# Patient Record
Sex: FEMALE | Race: WHITE | NOT HISPANIC OR LATINO | Employment: UNEMPLOYED | URBAN - METROPOLITAN AREA
[De-identification: names, ages, dates, MRNs, and addresses within clinical notes are randomized per-mention and may not be internally consistent; named-entity substitution may affect disease eponyms.]

---

## 2017-02-01 ENCOUNTER — OFFICE VISIT (OUTPATIENT)
Dept: OBSTETRICS AND GYNECOLOGY | Facility: CLINIC | Age: 27
End: 2017-02-01

## 2017-02-01 VITALS
WEIGHT: 221.4 LBS | DIASTOLIC BLOOD PRESSURE: 70 MMHG | BODY MASS INDEX: 35.58 KG/M2 | SYSTOLIC BLOOD PRESSURE: 122 MMHG | HEIGHT: 66 IN

## 2017-02-01 DIAGNOSIS — Z01.419 WELL WOMAN EXAM WITH ROUTINE GYNECOLOGICAL EXAM: Primary | ICD-10-CM

## 2017-02-01 DIAGNOSIS — Z30.46 NEXPLANON REMOVAL: ICD-10-CM

## 2017-02-01 PROCEDURE — 99395 PREV VISIT EST AGE 18-39: CPT | Performed by: NURSE PRACTITIONER

## 2017-02-01 PROCEDURE — 11982 REMOVE DRUG IMPLANT DEVICE: CPT | Performed by: NURSE PRACTITIONER

## 2017-02-01 RX ORDER — LEVONORGESTREL AND ETHINYL ESTRADIOL 0.1-0.02MG
1 KIT ORAL DAILY
Qty: 28 TABLET | Refills: 12 | Status: SHIPPED | OUTPATIENT
Start: 2017-02-01 | End: 2018-02-01

## 2017-02-01 NOTE — PROGRESS NOTES
Subjective     Chief Complaint   Patient presents with   • Gynecologic Exam     AE,PERIODS GETTING WORSE ON NEXPLANON, HAVING A LOT OF EMOTIONAL PROBLEMS,        History of Present Illness    Patience Leeanna is a 26 y.o. female who presents for annual exam. She is 1 year postpartum with her second child. She had nexplanon inserted about 2 months following delivery. She c/o emotional lability, depressed mood, and irritability since her daughter was born last year. Feels as if nexplanon has made these issues worse. Denies hx of depression or postpartum depression. Denies issues with bonding and feelings of hurting herself and baby. She reports periods mostly regular on nexplanon, about monthly lasting a week. She denies hx of abnormal paps in the past.     Obstetric History:  OB History     No data available         Menstrual History:     Patient's last menstrual period was 01/23/2017.       Sexual History:     Her menses are irregular, lasting 4-7 days.    Current contraception: nexplanon  History of abnormal Pap smear: no  Received Gardasil immunization: yes - .  Perform regular self breast exam: yes - .  Family history of uterine or ovarian cancer: yes - mother just recently dx  Family History of colon cancer: no  Family history of breast cancer: no    Mammogram: not indicated.  Colonoscopy: not indicated.  DEXA: not indicated.  Last Pap:    Smoking status: Current Every Day Smoker                                                   Packs/day: 0.00      Years: 0.00         Types: Electronic Cigarette     Smokeless status: Not on file                       Exercise: moderately active  Calcium/Vitamin D: adequate intake    The following portions of the patient's history were reviewed and updated as appropriate: allergies, current medications, past family history, past medical history, past social history, past surgical history and problem list.    Review of Systems   Constitutional: Negative.    HENT: Negative.   "  Eyes: Negative for visual disturbance.   Respiratory: Negative for cough, shortness of breath and wheezing.    Cardiovascular: Negative for chest pain, palpitations and leg swelling.   Gastrointestinal: Negative for abdominal distention, abdominal pain, blood in stool, constipation, diarrhea, nausea and vomiting.   Endocrine: Negative for cold intolerance and heat intolerance.   Genitourinary: Negative for difficulty urinating, dyspareunia, dysuria, frequency, genital sores, hematuria, menstrual problem, pelvic pain, urgency, vaginal bleeding, vaginal discharge and vaginal pain.   Musculoskeletal: Negative.    Skin: Negative.    Neurological: Negative for dizziness, weakness, light-headedness, numbness and headaches.   Hematological: Negative.    Psychiatric/Behavioral: Negative.  Negative for self-injury, sleep disturbance and suicidal ideas. The patient is not nervous/anxious.         Patience reports emotional lability, irritability, and depressed mood     Breasts: negative for lumps, skin changes, tenderness, swelling, and nipple discharge bilaterally         Objective   Physical Exam   Psychiatric: She has a normal mood and affect. Her speech is normal and behavior is normal. Judgment and thought content normal.   Tearful       Visit Vitals   • /70   • Ht 66\" (167.6 cm)   • Wt 221 lb 6.4 oz (100 kg)   • LMP 01/23/2017   • BMI 35.73 kg/m2       General:   alert, appears stated age and cooperative   Neck: no asymmetry, masses, or scars   Heart: regular rate and rhythm, S1, S2 normal, no murmur, click, rub or gallop   Lungs: clear to auscultation bilaterally   Abdomen: soft, non-tender, without masses or organomegaly   Breast: inspection negative, no nipple discharge or bleeding, no masses or nodularity palpable   Vulva: normal   Vagina: normal mucosa   Cervix: no cervical motion tenderness and no lesions   Uterus: normal size, normal shape and consistency   Adnexa: normal adnexa and no mass, fullness, " tenderness   Rectal: not indicated       Nexplanon Removal    Date of procedure:  2/1/2017    Risks and benefits discussed? yes  All questions answered? yes  Consents given by the patient    Local anesthesia used:  yes - 1 cc's of  Meds; anesthesia local: 1% lidocaine    Procedure documentation:    Risks, benefits, possible complications, and alternatives of removal of the implant were discussed. She desired to proceed with removal. The implant was located by palpation just under the skin of the left upper arm. The site was cleansed with betadine and numbed with 1 ml of 1% lidocaine. A 2mm longitudinal incision was made at the tip of the implant closest to the elbow. The implant was then grasped with forceps and removed. The implant was completely intact and shown to the patient. She tolerated the procedure well.  Sterile technique was observed. The site was bandaged with steri strips and a pressure dressing was applied. She was counseled on care measures and warning signs. She verbalized understanding and her questions were answered.      She tolerated the procedure well.  There were no complications.  EBL was minimal.    Post procedure instructions: Remove the wrapping in 24 hours and the steri-strips in 5 days.    Follow up needed: PRN    This note was electronically signed.    February 1, 2017      Assessment/Plan   Patience was seen today for gynecologic exam.    Diagnoses and all orders for this visit:    Well woman exam with routine gynecological exam  -     IGP,rfx Aptima HPV All Pth    Nexplanon removal    Other orders  -     levonorgestrel-ethinyl estradiol (AVIANE,ALESSE,LESSINA) 0.1-20 MG-MCG per tablet; Take 1 tablet by mouth Daily.        All questions answered.  Await pap smear results.  Breast self exam technique reviewed and patient encouraged to perform self-exam monthly.  Contraception: OCP (estrogen/progesterone).  Discussed healthy lifestyle modifications.  Pap smear today    Counseled Patience  regarding her symptoms and the possibility of the progesterone contributing to symptoms. Elen agrees and decided to have nexplanon removed. Discussed all other contraceptive options, including paragard and Mirena. Elen desires OCPs at this time. She desires something she has the ability to stop whenever she wants.  Risks, benefits, proper use, and warning signs discussed in detail. I encouraged Elen to seek counseling. Offered referral but Elen states there is a counselor at [facility] she would like to speak with first. She plans to do this soon. Also encouraged increasing exercise. Elen knows she can call me for physiatrist referral at any time. Elen verbalizes understanding and agrees with plan.    KIA Wang

## 2017-02-03 LAB
CONV .: NORMAL
CYTOLOGIST CVX/VAG CYTO: NORMAL
CYTOLOGY CVX/VAG DOC THIN PREP: NORMAL
DX ICD CODE: NORMAL
HIV 1 & 2 AB SER-IMP: NORMAL
Lab: NORMAL
OTHER STN SPEC: NORMAL
PATH REPORT.FINAL DX SPEC: NORMAL
STAT OF ADQ CVX/VAG CYTO-IMP: NORMAL

## 2017-03-20 ENCOUNTER — TELEPHONE (OUTPATIENT)
Dept: OBSTETRICS AND GYNECOLOGY | Facility: CLINIC | Age: 27
End: 2017-03-20

## 2017-03-20 RX ORDER — NITROFURANTOIN 25; 75 MG/1; MG/1
100 CAPSULE ORAL 2 TIMES DAILY
Qty: 14 CAPSULE | Refills: 0 | Status: SHIPPED | OUTPATIENT
Start: 2017-03-20 | End: 2017-03-27

## 2017-03-20 NOTE — TELEPHONE ENCOUNTER
Please tell patient I sent Macrobid to her pharmacy but she should come in for culture if this does not resolve her UTI.  Thank you.  GAGE

## 2017-09-19 ENCOUNTER — LAB (OUTPATIENT)
Dept: OBSTETRICS AND GYNECOLOGY | Facility: CLINIC | Age: 27
End: 2017-09-19

## 2017-09-19 DIAGNOSIS — N92.6 IRREGULAR BLEEDING: Primary | ICD-10-CM

## 2017-09-19 LAB — HCG INTACT+B SERPL-ACNC: NORMAL MIU/ML

## 2017-09-20 ENCOUNTER — TELEPHONE (OUTPATIENT)
Dept: OBSTETRICS AND GYNECOLOGY | Facility: CLINIC | Age: 27
End: 2017-09-20

## 2017-09-20 NOTE — TELEPHONE ENCOUNTER
Pt informed of + hcg. She will call office to schedule new OB ultrasound and appointment with Dr. Jennings. She verbalizes understanding.

## 2017-09-20 NOTE — TELEPHONE ENCOUNTER
----- Message from Cecilia Obrien sent at 9/20/2017  9:57 AM EDT -----  PT WOULD LIKE RESULTS OF HCG FROM YESTERDAY

## 2017-09-26 ENCOUNTER — PROCEDURE VISIT (OUTPATIENT)
Dept: OBSTETRICS AND GYNECOLOGY | Facility: CLINIC | Age: 27
End: 2017-09-26

## 2017-09-26 ENCOUNTER — OFFICE VISIT (OUTPATIENT)
Dept: OBSTETRICS AND GYNECOLOGY | Facility: CLINIC | Age: 27
End: 2017-09-26

## 2017-09-26 VITALS
SYSTOLIC BLOOD PRESSURE: 138 MMHG | WEIGHT: 221 LBS | BODY MASS INDEX: 35.52 KG/M2 | DIASTOLIC BLOOD PRESSURE: 76 MMHG | HEIGHT: 66 IN

## 2017-09-26 DIAGNOSIS — O20.0 THREATENED MISCARRIAGE: Primary | ICD-10-CM

## 2017-09-26 PROCEDURE — 76817 TRANSVAGINAL US OBSTETRIC: CPT | Performed by: OBSTETRICS & GYNECOLOGY

## 2017-09-26 PROCEDURE — 99213 OFFICE O/P EST LOW 20 MIN: CPT | Performed by: OBSTETRICS & GYNECOLOGY

## 2017-09-26 NOTE — PROGRESS NOTES
"Subjective    Chief Complaint   Patient presents with   • Follow-up     FU, HERE AS NOB, HAS BEEN ON BCP, MISSED PERIOD IN AUGUST, HAD SOME BLEEDING 2 WEEKS AGO, NO CRAMPING      History of Present Illness    Patience Leeanna is a 27 y.o. female who presents for proof of pregancy. HCG 9/19/17= 4863.  US today shows gestational sac only. A+.  Some old spotting since     Obstetric History:  OB History     No data available         Menstrual History:     No LMP recorded.       History reviewed. No pertinent past medical history.  Family History   Problem Relation Age of Onset   • Endometrial cancer Mother    • Hypertension Mother    • Diabetes Maternal Grandmother    • Hypertension Maternal Grandmother        The following portions of the patient's history were reviewed and updated as appropriate: allergies, current medications, past medical history, past surgical history and problem list.    Review of Systems  + spotting and cramping        Objective   Physical Exam  Vagina with a little old blood only.  Uterus mildly enlarged only without tenderness.  Adnexa neg   /76  Ht 66\" (167.6 cm)  Wt 221 lb (100 kg)  BMI 35.67 kg/m2    Assessment/Plan   Elen was seen today for follow-up.    Diagnoses and all orders for this visit:    Threatened miscarriage  -     HCG, B-subunit, Quantitative  -     Chlamydia trachomatis, Neisseria gonorrhoeae, PCR      Call Tomorrow for hCG results.  Plan ultrasound in 2 weeks to check for viability.    20 minute visit today of which 15 minutes was face-to-face counseling concerning the workup for threatened miscarriage and subsequent follow-up.           "

## 2017-09-27 ENCOUNTER — TELEPHONE (OUTPATIENT)
Dept: OBSTETRICS AND GYNECOLOGY | Facility: CLINIC | Age: 27
End: 2017-09-27

## 2017-09-27 LAB — HCG INTACT+B SERPL-ACNC: NORMAL MIU/ML

## 2017-09-27 NOTE — TELEPHONE ENCOUNTER
Discussed hCG only going from 4800 to about 9000 over 6 -7 days.  Patient not having any active bleeding now.  Will keep next appointment for ultrasound as a precaution but  realizes that this very  likely could be a nonviable pregnancy.GAGE

## 2017-09-29 LAB
C TRACH RRNA SPEC QL NAA+PROBE: NEGATIVE
N GONORRHOEA RRNA SPEC QL NAA+PROBE: NEGATIVE

## 2017-10-02 ENCOUNTER — TELEPHONE (OUTPATIENT)
Dept: OBSTETRICS AND GYNECOLOGY | Facility: CLINIC | Age: 27
End: 2017-10-02

## 2017-10-02 NOTE — TELEPHONE ENCOUNTER
MAB. WENT TO SUB ER SUN AND CONFIRMED. AS OF YESTERDAY HCG WAS 8000. WANTS TO KNOW NEXT STEP. PT  [phone].

## 2017-10-02 NOTE — TELEPHONE ENCOUNTER
Patient making appointment for tomorrow afternoon for an ultrasound and a repeat quantitative hCG.  She states her hCG had dropped from 9672-7204 when she was seen in the ER for heavy bleeding on Saturday and an ultrasound showed nothing consistent with a possible complete miscarriage.  She'll be given warnings about going to the ER or call if she has a fever GAGE

## 2017-10-03 ENCOUNTER — OFFICE VISIT (OUTPATIENT)
Dept: OBSTETRICS AND GYNECOLOGY | Facility: CLINIC | Age: 27
End: 2017-10-03

## 2017-10-03 ENCOUNTER — PROCEDURE VISIT (OUTPATIENT)
Dept: OBSTETRICS AND GYNECOLOGY | Facility: CLINIC | Age: 27
End: 2017-10-03

## 2017-10-03 VITALS
DIASTOLIC BLOOD PRESSURE: 76 MMHG | HEIGHT: 66 IN | WEIGHT: 221.8 LBS | SYSTOLIC BLOOD PRESSURE: 128 MMHG | BODY MASS INDEX: 35.65 KG/M2

## 2017-10-03 DIAGNOSIS — O03.9 COMPLETE ABORTION: Primary | ICD-10-CM

## 2017-10-03 DIAGNOSIS — O03.9 COMPLETE MISCARRIAGE: Primary | ICD-10-CM

## 2017-10-03 DIAGNOSIS — O02.1 MISSED AB: Primary | ICD-10-CM

## 2017-10-03 LAB
BASOPHILS # BLD AUTO: 0.07 10*3/MM3 (ref 0–0.2)
BASOPHILS NFR BLD AUTO: 0.8 % (ref 0–1.5)
EOSINOPHIL # BLD AUTO: 0.37 10*3/MM3 (ref 0–0.7)
EOSINOPHIL NFR BLD AUTO: 4.1 % (ref 0.3–6.2)
ERYTHROCYTE [DISTWIDTH] IN BLOOD BY AUTOMATED COUNT: 14.8 % (ref 11.7–13)
HCG INTACT+B SERPL-ACNC: NORMAL MIU/ML
HCT VFR BLD AUTO: 37.5 % (ref 35.6–45.5)
HGB BLD-MCNC: 11.5 G/DL (ref 11.9–15.5)
IMM GRANULOCYTES # BLD: 0.02 10*3/MM3 (ref 0–0.03)
IMM GRANULOCYTES NFR BLD: 0.2 % (ref 0–0.5)
LYMPHOCYTES # BLD AUTO: 2.25 10*3/MM3 (ref 0.9–4.8)
LYMPHOCYTES NFR BLD AUTO: 25.1 % (ref 19.6–45.3)
MCH RBC QN AUTO: 25.9 PG (ref 26.9–32)
MCHC RBC AUTO-ENTMCNC: 30.7 G/DL (ref 32.4–36.3)
MCV RBC AUTO: 84.5 FL (ref 80.5–98.2)
MONOCYTES # BLD AUTO: 0.71 10*3/MM3 (ref 0.2–1.2)
MONOCYTES NFR BLD AUTO: 7.9 % (ref 5–12)
NEUTROPHILS # BLD AUTO: 5.53 10*3/MM3 (ref 1.9–8.1)
NEUTROPHILS NFR BLD AUTO: 61.9 % (ref 42.7–76)
PLATELET # BLD AUTO: 384 10*3/MM3 (ref 140–500)
RBC # BLD AUTO: 4.44 10*6/MM3 (ref 3.9–5.2)
WBC # BLD AUTO: 8.95 10*3/MM3 (ref 4.5–10.7)

## 2017-10-03 PROCEDURE — 99213 OFFICE O/P EST LOW 20 MIN: CPT | Performed by: OBSTETRICS & GYNECOLOGY

## 2017-10-03 PROCEDURE — 76817 TRANSVAGINAL US OBSTETRIC: CPT | Performed by: OBSTETRICS & GYNECOLOGY

## 2017-10-03 NOTE — PROGRESS NOTES
"Subjective  CC here for f/u on miscarriage    History of Present Illness    Patience Leeanna is a 27 y.o. female who presents for follow-up for miscarriage.  Patient had a serum hCG which are dropped from approximately 7184-3953 and was seen in the emergency room last weekend.  She comes in today still having vaginal bleeding but a pelvic ultrasound shows a thick endometrium but no gestational sac which was previously present with an endometrial thickness of 1.2 cm, not unusual for this clinical presentation.  Patient has had no fever or other symptoms other than cramping.    Obstetric History:  OB History     No data available         Menstrual History:     No LMP recorded.       History reviewed. No pertinent past medical history.  Family History   Problem Relation Age of Onset   • Endometrial cancer Mother    • Hypertension Mother    • Diabetes Maternal Grandmother    • Hypertension Maternal Grandmother        The following portions of the patient's history were reviewed and updated as appropriate: allergies and current medications.    Review of Systems  As per history of present illness.       Objective   Physical Exam  Examination today shows the cervix to have some clots that come from it but her cervix is totally closed.  Uterus nontender normal size.  Adnexa negative.  She is not actively bleeding at this time.  /76  Ht 66\" (167.6 cm)  Wt 221 lb 12.8 oz (101 kg)  BMI 35.8 kg/m2    Assessment/Plan   Patiella was seen today for follow-up.    Diagnoses and all orders for this visit:    Complete miscarriage  -     CBC & Differential  -     HCG, B-subunit, Quantitative    Plan.  Patient is not interested in a D&C at this time and I do not feel she needs one.  She has an appointment in 1 week at which time she will get rechecked and repeat her hCG.  She and her  have been totally counseled about going to the emergency room or calling with any fever or heavy bleeding.    20 minute visit today of " which 15 minutes was face-to-face counseling concerning the evaluation and treatment options of miscarriage.

## 2017-10-05 ENCOUNTER — TELEPHONE (OUTPATIENT)
Dept: OBSTETRICS AND GYNECOLOGY | Facility: CLINIC | Age: 27
End: 2017-10-05

## 2017-10-05 NOTE — TELEPHONE ENCOUNTER
----- Message from Alfredo Jennings MD sent at 10/4/2017  9:37 AM EDT -----  Please tell patient her hCG has dropped to 2287.  She should keep her next week appointment.  Thank you.  GAGE

## 2017-10-10 ENCOUNTER — OFFICE VISIT (OUTPATIENT)
Dept: OBSTETRICS AND GYNECOLOGY | Facility: CLINIC | Age: 27
End: 2017-10-10

## 2017-10-10 VITALS
DIASTOLIC BLOOD PRESSURE: 78 MMHG | HEIGHT: 66 IN | WEIGHT: 220 LBS | BODY MASS INDEX: 35.36 KG/M2 | SYSTOLIC BLOOD PRESSURE: 110 MMHG

## 2017-10-10 DIAGNOSIS — O03.9 COMPLETE MISCARRIAGE: Primary | ICD-10-CM

## 2017-10-10 PROCEDURE — 99213 OFFICE O/P EST LOW 20 MIN: CPT | Performed by: OBSTETRICS & GYNECOLOGY

## 2017-10-10 RX ORDER — ESCITALOPRAM OXALATE 10 MG/1
TABLET ORAL
Refills: 1 | COMMUNITY
Start: 2017-07-25

## 2017-10-10 NOTE — PROGRESS NOTES
"Subjective    Chief Complaint   Patient presents with   • Gynecologic Exam     MAB       History of Present Illness    Patience Leeanna is a 27 y.o. female who presents for follow up on ? Complete miscarriage.  A+.  Pt had hcg= 2287 last week and now no longer is bleeding or having any problems.  US last week no longer showed gestational sac, mildly thickened endometrial stripe only.     Obstetric History:  OB History     No data available         Menstrual History:     No LMP recorded.       History reviewed. No pertinent past medical history.  Family History   Problem Relation Age of Onset   • Endometrial cancer Mother    • Hypertension Mother    • Diabetes Maternal Grandmother    • Hypertension Maternal Grandmother        The following portions of the patient's history were reviewed and updated as appropriate: allergies, current medications, past medical history, past surgical history and problem list.    Review of Systems  Neg        Objective   Physical Exam    /78  Ht 66\" (167.6 cm)  Wt 220 lb (99.8 kg)  BMI 35.51 kg/m2    Assessment/Plan   Patience was seen today for gynecologic exam.    Diagnoses and all orders for this visit:    Complete miscarriage     Patient will begin following quantitative hCG weekly still less than 10.  If having any fever, heavy bleeding etc. she will contact me.    15 Minute appointment of which all was counseling face-to-face concerning the continual evaluation for a complete miscarriage.           "

## 2017-10-11 ENCOUNTER — TELEPHONE (OUTPATIENT)
Dept: OBSTETRICS AND GYNECOLOGY | Facility: CLINIC | Age: 27
End: 2017-10-11

## 2017-10-11 DIAGNOSIS — O03.9 MISCARRIAGE: Primary | ICD-10-CM

## 2017-10-11 LAB — HCG INTACT+B SERPL-ACNC: 115.5 MIU/ML

## 2017-10-11 NOTE — TELEPHONE ENCOUNTER
----- Message from Alfredo Jennings MD sent at 10/11/2017 10:07 AM EDT -----  Please tell patient her hCG has dropped all the way to 115.  She just needs to come in next week to repeat it as we discussed at her office visit yesterday.  Thank you.  GAGE

## 2017-10-11 NOTE — TELEPHONE ENCOUNTER
Patient's hCG dropped to 115 from over 2000.  She will come in next week to repeat it as we discussed in office yesterday.  GAGE

## 2017-10-16 ENCOUNTER — RESULTS ENCOUNTER (OUTPATIENT)
Dept: OBSTETRICS AND GYNECOLOGY | Facility: CLINIC | Age: 27
End: 2017-10-16

## 2017-10-16 DIAGNOSIS — O03.9 MISCARRIAGE: ICD-10-CM

## 2018-03-06 ENCOUNTER — TELEPHONE (OUTPATIENT)
Dept: OBSTETRICS AND GYNECOLOGY | Facility: CLINIC | Age: 28
End: 2018-03-06

## 2018-03-06 DIAGNOSIS — N91.2 AMENORRHEA: Primary | ICD-10-CM

## 2018-03-07 ENCOUNTER — LAB (OUTPATIENT)
Dept: OBSTETRICS AND GYNECOLOGY | Facility: CLINIC | Age: 28
End: 2018-03-07

## 2018-03-07 DIAGNOSIS — N91.2 AMENORRHEA: ICD-10-CM

## 2018-03-07 LAB — HCG INTACT+B SERPL-ACNC: NORMAL MIU/ML

## 2018-03-28 ENCOUNTER — INITIAL PRENATAL (OUTPATIENT)
Dept: OBSTETRICS AND GYNECOLOGY | Facility: CLINIC | Age: 28
End: 2018-03-28

## 2018-03-28 ENCOUNTER — PROCEDURE VISIT (OUTPATIENT)
Dept: OBSTETRICS AND GYNECOLOGY | Facility: CLINIC | Age: 28
End: 2018-03-28

## 2018-03-28 VITALS — DIASTOLIC BLOOD PRESSURE: 72 MMHG | WEIGHT: 227.8 LBS | BODY MASS INDEX: 36.77 KG/M2 | SYSTOLIC BLOOD PRESSURE: 130 MMHG

## 2018-03-28 DIAGNOSIS — Z34.81 PRENATAL CARE, SUBSEQUENT PREGNANCY, FIRST TRIMESTER: Primary | ICD-10-CM

## 2018-03-28 DIAGNOSIS — B37.31 YEAST VAGINITIS: ICD-10-CM

## 2018-03-28 DIAGNOSIS — Z36.89 ENCOUNTER TO ESTABLISH GESTATIONAL AGE USING ULTRASOUND: Primary | ICD-10-CM

## 2018-03-28 LAB
BILIRUB BLD-MCNC: NEGATIVE MG/DL
GLUCOSE UR STRIP-MCNC: NEGATIVE MG/DL
KETONES UR QL: NEGATIVE
LEUKOCYTE EST, POC: ABNORMAL
NITRITE UR-MCNC: NEGATIVE MG/ML
PH UR: 7 [PH] (ref 5–8)
PROT UR STRIP-MCNC: ABNORMAL MG/DL
RBC # UR STRIP: NEGATIVE /UL
SP GR UR: 1.02 (ref 1–1.03)
UROBILINOGEN UR QL: NORMAL

## 2018-03-28 PROCEDURE — 0501F PRENATAL FLOW SHEET: CPT | Performed by: OBSTETRICS & GYNECOLOGY

## 2018-03-28 PROCEDURE — 81002 URINALYSIS NONAUTO W/O SCOPE: CPT | Performed by: OBSTETRICS & GYNECOLOGY

## 2018-03-28 PROCEDURE — 76817 TRANSVAGINAL US OBSTETRIC: CPT | Performed by: OBSTETRICS & GYNECOLOGY

## 2018-03-28 RX ORDER — PRENATAL VIT NO.126/IRON/FOLIC 28MG-0.8MG
TABLET ORAL DAILY
COMMUNITY

## 2018-03-28 NOTE — PROGRESS NOTES
CC Initial OB Visit.  Using 11/10/18 ultrasound EDC as questionable LMP.  Patient desiring repeat  and tubal ligation as has had 2 previous C-sections.  No current problems except yeast infection.  Did have hypertension first pregnancy.  Review of Systems - ENT ROS: negative  Hematological and Lymphatic ROS: negative  Endocrine ROS: negative  Breast ROS: negative  Respiratory ROS: negative  Cardiovascular ROS: negative  Gastrointestinal ROS: negative  Genito-Urinary ROS: negative except copious vaginal dc   Musculoskeletal ROS: negative  Neurological ROS: negative  Dermatological ROS: negative  Assessment.  7 weeks 4 days gestation, history 2 previous  sections desiring repeat  and tubal ligation, yeast infection.  Plan.  Return to office 5 weeks for lab work to include fetal DNA testing and Inheritest Core.  Terazol-7

## 2018-03-30 LAB
CONV .: NORMAL
CONV .: NORMAL
CYTOLOGIST CVX/VAG CYTO: NORMAL
CYTOLOGY CVX/VAG DOC THIN PREP: NORMAL
DX ICD CODE: NORMAL
DX ICD CODE: NORMAL
HIV 1 & 2 AB SER-IMP: NORMAL
Lab: NORMAL
OTHER STN SPEC: NORMAL
PATH REPORT.FINAL DX SPEC: NORMAL
STAT OF ADQ CVX/VAG CYTO-IMP: NORMAL

## 2018-04-01 LAB
BACTERIA UR CULT: NORMAL
BACTERIA UR CULT: NORMAL

## 2018-04-02 LAB
A VAGINAE DNA VAG QL NAA+PROBE: ABNORMAL SCORE
BVAB2 DNA VAG QL NAA+PROBE: ABNORMAL SCORE
C ALBICANS DNA VAG QL NAA+PROBE: POSITIVE
C GLABRATA DNA VAG QL NAA+PROBE: NEGATIVE
C TRACH RRNA SPEC QL NAA+PROBE: NEGATIVE
MEGA1 DNA VAG QL NAA+PROBE: ABNORMAL SCORE
N GONORRHOEA RRNA SPEC QL NAA+PROBE: NEGATIVE
T VAGINALIS RRNA SPEC QL NAA+PROBE: NEGATIVE

## 2018-04-09 ENCOUNTER — TELEPHONE (OUTPATIENT)
Dept: OBSTETRICS AND GYNECOLOGY | Facility: CLINIC | Age: 28
End: 2018-04-09

## 2018-04-09 RX ORDER — ONDANSETRON 4 MG/1
4 TABLET, FILM COATED ORAL EVERY 8 HOURS PRN
Qty: 24 TABLET | Refills: 2 | Status: SHIPPED | OUTPATIENT
Start: 2018-04-09

## 2018-05-07 ENCOUNTER — ROUTINE PRENATAL (OUTPATIENT)
Dept: OBSTETRICS AND GYNECOLOGY | Facility: CLINIC | Age: 28
End: 2018-05-07

## 2018-05-07 VITALS — SYSTOLIC BLOOD PRESSURE: 128 MMHG | WEIGHT: 221.3 LBS | DIASTOLIC BLOOD PRESSURE: 84 MMHG | BODY MASS INDEX: 35.72 KG/M2

## 2018-05-07 DIAGNOSIS — Z34.82 PRENATAL CARE, SUBSEQUENT PREGNANCY IN SECOND TRIMESTER: Primary | ICD-10-CM

## 2018-05-07 LAB
BILIRUB BLD-MCNC: NEGATIVE MG/DL
GLUCOSE UR STRIP-MCNC: NEGATIVE MG/DL
KETONES UR QL: NEGATIVE
LEUKOCYTE EST, POC: NEGATIVE
NITRITE UR-MCNC: NEGATIVE MG/ML
PH UR: 7.5 [PH] (ref 5–8)
PROT UR STRIP-MCNC: NEGATIVE MG/DL
RBC # UR STRIP: NEGATIVE /UL
SP GR UR: 1.02 (ref 1–1.03)
UROBILINOGEN UR QL: NORMAL

## 2018-05-07 PROCEDURE — 0502F SUBSEQUENT PRENATAL CARE: CPT | Performed by: OBSTETRICS & GYNECOLOGY

## 2018-05-07 PROCEDURE — 81002 URINALYSIS NONAUTO W/O SCOPE: CPT | Performed by: OBSTETRICS & GYNECOLOGY

## 2018-05-07 NOTE — PROGRESS NOTES
CC F/U prenatal visit.  No problems.  Getting lab work drawn today including Inheritest Core and Informaseq.  Return to office 4 weeks.

## 2018-05-18 LAB
# FETUSES US: 1
ABO GROUP BLD: (no result)
BASOPHILS # BLD AUTO: 0 X10E3/UL (ref 0–0.2)
BASOPHILS NFR BLD AUTO: 0 %
BLD GP AB SCN SERPL QL: NEGATIVE
CFDNA.FET/CFDNA.TOTAL SFR FETUS: 13 %
CHR X + Y ANEUP PLAS.CFDNA: NORMAL
CITATION REF LAB TEST: NORMAL
CLINICAL INFO: NORMAL
CYTOGENETICS STUDY: NORMAL
EOSINOPHIL # BLD AUTO: 0.1 X10E3/UL (ref 0–0.4)
EOSINOPHIL NFR BLD AUTO: 2 %
ERYTHROCYTE [DISTWIDTH] IN BLOOD BY AUTOMATED COUNT: 16.8 % (ref 12.3–15.4)
EST. AVERAGE GLUCOSE BLD GHB EST-MCNC: 103 MG/DL
ETHNIC BACKGROUND STATED: NORMAL
FET 13+18+21+X+Y ANEUP PLAS.CFDNA: NORMAL
FET CHR 13 TS PLAS.CFDNA QL: NORMAL
FET CHR 13 TS PLAS.CFDNA QL: NORMAL
FET CHR 18 TS PLAS.CFDNA QL: NORMAL
FET CHR 18 TS PLAS.CFDNA QL: NORMAL
FET CHR 21 TS PLAS.CFDNA QL: NORMAL
FET CHR 21 TS PLAS.CFDNA QL: NORMAL
FET CHROM X + Y ANEUP CFDNA IMP: NORMAL
GA: 13.3 WEEKS
GENE MUT TESTED BLD/T: NORMAL
GENETIC ALGORITHM SENSITIVITY: NORMAL %
GENETIC COUNSELOR:: NORMAL
HBA1C MFR BLD: 5.2 % (ref 4.8–5.6)
HBV SURFACE AG SERPL QL IA: NEGATIVE
HCT VFR BLD AUTO: 35.3 % (ref 34–46.6)
HCV AB S/CO SERPL IA: <0.1 S/CO RATIO (ref 0–0.9)
HGB BLD-MCNC: 11.6 G/DL (ref 11.1–15.9)
HIV 1+2 AB+HIV1 P24 AG SERPL QL IA: NON REACTIVE
IMM GRANULOCYTES # BLD: 0 X10E3/UL (ref 0–0.1)
IMM GRANULOCYTES NFR BLD: 0 %
LAB DIRECTOR NAME PROVIDER: NORMAL
LAB DIRECTOR NAME PROVIDER: NORMAL
LABORATORY COMMENT REPORT: NORMAL
LYMPHOCYTES # BLD AUTO: 1.6 X10E3/UL (ref 0.7–3.1)
LYMPHOCYTES NFR BLD AUTO: 20 %
Lab: NORMAL
MCH RBC QN AUTO: 26.1 PG (ref 26.6–33)
MCHC RBC AUTO-ENTMCNC: 32.9 G/DL (ref 31.5–35.7)
MCV RBC AUTO: 79 FL (ref 79–97)
MOL DX INTERP BLD/T QL: NORMAL
MONOCYTES # BLD AUTO: 0.4 X10E3/UL (ref 0.1–0.9)
MONOCYTES NFR BLD AUTO: 5 %
NEUTROPHILS # BLD AUTO: 5.6 X10E3/UL (ref 1.4–7)
NEUTROPHILS NFR BLD AUTO: 73 %
PLATELET # BLD AUTO: 291 X10E3/UL (ref 150–379)
RBC # BLD AUTO: 4.45 X10E6/UL (ref 3.77–5.28)
REASON FOR REFERRAL (NARRATIVE): NORMAL
REASON FOR REFERRAL (NARRATIVE): NORMAL
REF LAB TEST METHOD: NORMAL
REF LAB TEST METHOD: NORMAL
RH BLD: POSITIVE
RPR SER QL: NON REACTIVE
RUBV IGG SERPL IA-ACNC: 4.09 INDEX
SERVICE CMNT 02-IMP: NORMAL
SERVICE CMNT 02-IMP: NORMAL
SERVICE CMNT-IMP: NORMAL
SPECIMEN SOURCE: NORMAL
TEST PERFORMANCE INFO SPEC: NORMAL
WBC # BLD AUTO: 7.8 X10E3/UL (ref 3.4–10.8)

## 2018-05-21 ENCOUNTER — TELEPHONE (OUTPATIENT)
Dept: OBSTETRICS AND GYNECOLOGY | Facility: CLINIC | Age: 28
End: 2018-05-21

## 2018-05-21 NOTE — TELEPHONE ENCOUNTER
----- Message from Alfredo Jennings MD sent at 5/21/2018  7:42 AM EDT -----  Pl ease tell pt CF and Informaseq normal. It's a girl if wants to know. Thanks GAGE

## 2018-06-06 ENCOUNTER — ROUTINE PRENATAL (OUTPATIENT)
Dept: OBSTETRICS AND GYNECOLOGY | Facility: CLINIC | Age: 28
End: 2018-06-06

## 2018-06-06 VITALS — BODY MASS INDEX: 35.99 KG/M2 | WEIGHT: 223 LBS | DIASTOLIC BLOOD PRESSURE: 60 MMHG | SYSTOLIC BLOOD PRESSURE: 120 MMHG

## 2018-06-06 DIAGNOSIS — Z34.82 PRENATAL CARE, SUBSEQUENT PREGNANCY, SECOND TRIMESTER: Primary | ICD-10-CM

## 2018-06-06 LAB
BILIRUB BLD-MCNC: NEGATIVE MG/DL
GLUCOSE UR STRIP-MCNC: NEGATIVE MG/DL
KETONES UR QL: NEGATIVE
LEUKOCYTE EST, POC: NEGATIVE
NITRITE UR-MCNC: NEGATIVE MG/ML
PH UR: 7 [PH] (ref 5–8)
PROT UR STRIP-MCNC: NEGATIVE MG/DL
RBC # UR STRIP: NEGATIVE /UL
SP GR UR: 1.01 (ref 1–1.03)
UROBILINOGEN UR QL: NORMAL

## 2018-06-06 PROCEDURE — 0502F SUBSEQUENT PRENATAL CARE: CPT | Performed by: OBSTETRICS & GYNECOLOGY

## 2018-06-06 PROCEDURE — 81002 URINALYSIS NONAUTO W/O SCOPE: CPT | Performed by: OBSTETRICS & GYNECOLOGY

## 2018-06-11 ENCOUNTER — RESULTS ENCOUNTER (OUTPATIENT)
Dept: OBSTETRICS AND GYNECOLOGY | Facility: CLINIC | Age: 28
End: 2018-06-11

## 2018-06-11 DIAGNOSIS — Z34.82 PRENATAL CARE, SUBSEQUENT PREGNANCY, SECOND TRIMESTER: ICD-10-CM

## 2018-06-21 ENCOUNTER — ROUTINE PRENATAL (OUTPATIENT)
Dept: OBSTETRICS AND GYNECOLOGY | Facility: CLINIC | Age: 28
End: 2018-06-21

## 2018-06-21 ENCOUNTER — PROCEDURE VISIT (OUTPATIENT)
Dept: OBSTETRICS AND GYNECOLOGY | Facility: CLINIC | Age: 28
End: 2018-06-21

## 2018-06-21 VITALS — SYSTOLIC BLOOD PRESSURE: 120 MMHG | BODY MASS INDEX: 36.48 KG/M2 | WEIGHT: 226 LBS | DIASTOLIC BLOOD PRESSURE: 70 MMHG

## 2018-06-21 DIAGNOSIS — Z36.3 ANTENATAL SCREENING FOR MALFORMATION USING ULTRASONICS: Primary | ICD-10-CM

## 2018-06-21 DIAGNOSIS — Z34.82 PRENATAL CARE, SUBSEQUENT PREGNANCY, SECOND TRIMESTER: Primary | ICD-10-CM

## 2018-06-21 LAB
BILIRUB BLD-MCNC: NEGATIVE MG/DL
GLUCOSE UR STRIP-MCNC: NEGATIVE MG/DL
KETONES UR QL: NEGATIVE
LEUKOCYTE EST, POC: ABNORMAL
NITRITE UR-MCNC: NEGATIVE MG/ML
PH UR: 7 [PH] (ref 5–8)
PROT UR STRIP-MCNC: NEGATIVE MG/DL
RBC # UR STRIP: NEGATIVE /UL
SP GR UR: 1.02 (ref 1–1.03)
UROBILINOGEN UR QL: NORMAL

## 2018-06-21 PROCEDURE — 76805 OB US >/= 14 WKS SNGL FETUS: CPT | Performed by: OBSTETRICS & GYNECOLOGY

## 2018-06-21 PROCEDURE — 59425 ANTEPARTUM CARE ONLY: CPT | Performed by: OBSTETRICS & GYNECOLOGY

## 2018-06-21 NOTE — PROGRESS NOTES
CC F/U prenatal visit.  Good fetal movement.  AFP refused.  Normal anatomy screen today but incomplete so we will recheck with one hour glucose next visit.  Having some mild round ligament pain but that has resolved today.  Return to office 4 weeks.  Planning a repeat  and tubal ligation.

## 2018-06-26 ENCOUNTER — RESULTS ENCOUNTER (OUTPATIENT)
Dept: OBSTETRICS AND GYNECOLOGY | Facility: CLINIC | Age: 28
End: 2018-06-26

## 2018-06-26 DIAGNOSIS — Z34.82 PRENATAL CARE, SUBSEQUENT PREGNANCY, SECOND TRIMESTER: ICD-10-CM

## 2022-09-18 ENCOUNTER — HOSPITAL ENCOUNTER (EMERGENCY)
Facility: HOSPITAL | Age: 32
Discharge: HOME | End: 2022-09-18
Attending: EMERGENCY MEDICINE | Admitting: EMERGENCY MEDICINE

## 2022-09-18 VITALS
HEART RATE: 87 BPM | HEIGHT: 66 IN | WEIGHT: 248.9 LBS | OXYGEN SATURATION: 100 % | TEMPERATURE: 98.6 F | DIASTOLIC BLOOD PRESSURE: 84 MMHG | SYSTOLIC BLOOD PRESSURE: 132 MMHG | RESPIRATION RATE: 20 BRPM | BODY MASS INDEX: 40 KG/M2

## 2022-09-18 DIAGNOSIS — M54.50 ACUTE RIGHT-SIDED LOW BACK PAIN WITHOUT SCIATICA: Primary | ICD-10-CM

## 2022-09-18 PROCEDURE — 25010000002 DEXAMETHASONE SODIUM PHOSPHATE 10 MG/ML SOLUTION

## 2022-09-18 PROCEDURE — 25010000002 KETOROLAC TROMETHAMINE PER 15 MG

## 2022-09-18 PROCEDURE — 96372 THER/PROPH/DIAG INJ SC/IM: CPT

## 2022-09-18 PROCEDURE — 99282 EMERGENCY DEPT VISIT SF MDM: CPT

## 2022-09-18 RX ORDER — KETOROLAC TROMETHAMINE 30 MG/ML
30 INJECTION, SOLUTION INTRAMUSCULAR; INTRAVENOUS ONCE
Status: COMPLETED | OUTPATIENT
Start: 2022-09-18 | End: 2022-09-18

## 2022-09-18 RX ORDER — PREDNISONE 20 MG/1
20 TABLET ORAL DAILY
Qty: 5 TABLET | Refills: 0 | Status: SHIPPED | OUTPATIENT
Start: 2022-09-18

## 2022-09-18 RX ORDER — CYCLOBENZAPRINE HCL 10 MG
10 TABLET ORAL 3 TIMES DAILY PRN
Qty: 15 TABLET | Refills: 0 | Status: SHIPPED | OUTPATIENT
Start: 2022-09-18

## 2022-09-18 RX ORDER — DEXAMETHASONE SODIUM PHOSPHATE 10 MG/ML
8 INJECTION, SOLUTION INTRAMUSCULAR; INTRAVENOUS ONCE
Status: COMPLETED | OUTPATIENT
Start: 2022-09-18 | End: 2022-09-18

## 2022-09-18 RX ADMIN — DEXAMETHASONE SODIUM PHOSPHATE 8 MG: 10 INJECTION, SOLUTION INTRAMUSCULAR; INTRAVENOUS at 21:42

## 2022-09-18 RX ADMIN — KETOROLAC TROMETHAMINE 30 MG: 30 INJECTION, SOLUTION INTRAMUSCULAR; INTRAVENOUS at 21:41

## 2022-09-19 NOTE — ED PROVIDER NOTES
Time: 9:23 PM EDT  Arrived by: private car  Chief Complaint: low back pain   History provided by: patient  History is limited by: N/A     History of Present Illness:  Patient is a 32 y.o. year old female who presents to the emergency department with low back pain.  Patient states on Thursday while she was working she spent a lot of time bent over and since that time she has had some pain in her low back.  Patient states on Friday and Saturday she was able to control the back pain with ibuprofen 800s, but today the pain is not controlled with ibuprofen seems to be worsening and shooting towards her right hip.  Patient denies any fall or known injury.  Patient denies any previous back problems.  Patient denies any loss of bowel or bladder function and saddle anesthesia.  No other complaints.          History provided by:  Patient   used: No    Back Pain  Location:  Lumbar spine  Quality:  Aching  Pain severity:  Moderate  Onset quality:  Gradual  Duration: 3 days.  Timing:  Constant  Progression:  Waxing and waning  Chronicity:  New  Context: physical stress    Relieved by:  Ibuprofen  Worsened by:  Standing and twisting  Associated symptoms: no abdominal pain, no abdominal swelling, no bladder incontinence, no bowel incontinence, no chest pain, no dysuria, no fever, no headaches, no leg pain, no numbness, no paresthesias, no pelvic pain, no perianal numbness, no tingling, no weakness and no weight loss        Similar Symptoms Previously: Yes  Recently seen: No      Patient Care Team  Primary Care Provider: Liz Bagley MD    Past Medical History:     Allergies   Allergen Reactions   • Amoxicillin Nausea And Vomiting     Upsets stomach  Upsets stomach     Past Medical History:   Diagnosis Date   • Anxiety    • Gestational hypertension    • Kidney stones      Past Surgical History:   Procedure Laterality Date   •  SECTION       Family History   Problem Relation Age of Onset   •  "Endometrial cancer Mother    • Hypertension Mother    • Diabetes Maternal Grandmother    • Hypertension Maternal Grandmother        Home Medications:  Prior to Admission medications    Medication Sig Start Date End Date Taking? Authorizing Provider   escitalopram (LEXAPRO) 10 MG tablet TAKE 1 TABLET BY MOUTH ONE TIME A DAY 7/25/17   ProviderEstephanie MD   ondansetron (ZOFRAN) 4 MG tablet Take 1 tablet by mouth Every 8 (Eight) Hours As Needed for Nausea or Vomiting. 4/9/18   Alfredo Jennings MD   Prenatal Vit-Fe Fumarate-FA (PRENATAL, CLASSIC, VITAMIN) 28-0.8 MG tablet tablet Take  by mouth Daily.    Provider, MD Estephanie   terconazole (TERAZOL 7) 0.4 % vaginal cream Insert 1 applicator into the vagina Every Night. 3/28/18   Alfredo Jennings MD        Social History:   Social History     Tobacco Use   • Smoking status: Former Smoker   • Smokeless tobacco: Former User   • Tobacco comment: stopped 3-19-18   Substance Use Topics   • Alcohol use: No   • Drug use: No     Recent travel: no     Review of Systems:  Review of Systems   Constitutional: Negative for chills, fever and weight loss.   HENT: Negative for ear pain.    Eyes: Negative for pain.   Respiratory: Negative for cough and shortness of breath.    Cardiovascular: Negative for chest pain.   Gastrointestinal: Negative for abdominal pain, bowel incontinence, diarrhea, nausea and vomiting.   Genitourinary: Negative for bladder incontinence, dysuria and pelvic pain.   Musculoskeletal: Positive for back pain. Negative for arthralgias.   Skin: Negative for rash.   Neurological: Negative for tingling, weakness, numbness, headaches and paresthesias.        Physical Exam:  /84 (BP Location: Right arm, Patient Position: Sitting)   Pulse 87   Temp 98.6 °F (37 °C) (Oral)   Resp 20   Ht 167.6 cm (66\")   Wt 113 kg (248 lb 14.4 oz)   LMP 01/31/2018   SpO2 100%   BMI 40.17 kg/m²     Physical Exam  Vitals and nursing note reviewed.   Constitutional:       " Appearance: Normal appearance. She is normal weight.   HENT:      Head: Normocephalic and atraumatic.      Nose: Nose normal.   Eyes:      Extraocular Movements: Extraocular movements intact.      Conjunctiva/sclera: Conjunctivae normal.      Pupils: Pupils are equal, round, and reactive to light.   Cardiovascular:      Rate and Rhythm: Normal rate and regular rhythm.      Heart sounds: Normal heart sounds.   Pulmonary:      Effort: Pulmonary effort is normal.      Breath sounds: Normal breath sounds.   Musculoskeletal:         General: Normal range of motion.      Cervical back: Normal range of motion and neck supple.        Back:       Comments: Normal range of motion of the lumbar spine.  There is no swelling, deformity, bony tenderness, laceration noted.  Tenderness to palpation of paraspinal muscles as noted in the above diagram.   Skin:     General: Skin is warm and dry.   Neurological:      General: No focal deficit present.      Mental Status: She is alert and oriented to person, place, and time.   Psychiatric:         Mood and Affect: Mood normal.         Behavior: Behavior normal.         Thought Content: Thought content normal.         Judgment: Judgment normal.                Medications in the Emergency Department:  Medications   ketorolac (TORADOL) injection 30 mg (30 mg Intramuscular Given 9/18/22 2141)   dexamethasone sodium phosphate injection 8 mg (8 mg Intramuscular Given 9/18/22 2142)        Labs  Lab Results (last 24 hours)     ** No results found for the last 24 hours. **           Imaging:  No Radiology Exams Resulted Within Past 24 Hours    Procedures:  Procedures    Progress                            The patient was initially evaluated in the triage area where orders were placed. The patient was later dispositioned by Dominik Ruano PA-C.      Medical Decision Making:  MDM  Number of Diagnoses or Management Options  Acute right-sided low back pain without sciatica  Diagnosis management  comments: I have spoken with patient. I have explained the patient´s condition, diagnoses and treatment plan based on the information available to me at this time. I have answered the patient's questions and addressed any concerns. The patient has a good  understanding of the patient´s diagnosis, condition, and treatment plan as can be expected at this point. The vital signs have been stable. The patient´s condition is stable and appropriate for discharge from the emergency department.      The patient will pursue further outpatient evaluation with the primary care physician or other designated or consulting physician as outlined in the discharge instructions. They are agreeable to this plan of care and follow-up instructions have been explained in detail. The patient has received these instructions in written format and have expressed an understanding of the discharge instructions. The patient is aware that any significant change in condition or worsening of symptoms should prompt an immediate return to this or the closest emergency department or call to 911.    Risk of Complications, Morbidity, and/or Mortality  Presenting problems: low  Diagnostic procedures: low  Management options: low    Patient Progress  Patient progress: stable       Final diagnoses:   Acute right-sided low back pain without sciatica        The following orders were placed after triage and evaluation:  No orders of the defined types were placed in this encounter.        Disposition:  ED Disposition     ED Disposition   Discharge    Condition   Stable    Comment   --             This medical record created using voice recognition software.           Dominik Ruano PA-C  09/18/22 [id number]

## 2022-09-19 NOTE — DISCHARGE INSTRUCTIONS
Take prednisone as prescribed.  Take Flexeril as needed for muscle pain.  You may also apply heat to the area 15 to 20 minutes at a time 4-5 times a day to improve the muscle pain.  Avoid heavy lifting for the next 2 to 3 days to allow time for the pain to improve.

## 2023-08-04 ENCOUNTER — HOSPITAL ENCOUNTER (EMERGENCY)
Facility: HOSPITAL | Age: 33
Discharge: HOME | End: 2023-08-04
Attending: EMERGENCY MEDICINE
Payer: COMMERCIAL

## 2023-08-04 ENCOUNTER — APPOINTMENT (OUTPATIENT)
Dept: GENERAL RADIOLOGY | Facility: HOSPITAL | Age: 33
End: 2023-08-04
Payer: COMMERCIAL

## 2023-08-04 VITALS
WEIGHT: 259.04 LBS | HEIGHT: 65 IN | SYSTOLIC BLOOD PRESSURE: 111 MMHG | HEART RATE: 85 BPM | BODY MASS INDEX: 43.16 KG/M2 | DIASTOLIC BLOOD PRESSURE: 70 MMHG | TEMPERATURE: 98.4 F | OXYGEN SATURATION: 97 % | RESPIRATION RATE: 18 BRPM

## 2023-08-04 DIAGNOSIS — R07.9 CHEST PAIN, UNSPECIFIED TYPE: Primary | ICD-10-CM

## 2023-08-04 LAB
ALBUMIN SERPL-MCNC: 4.2 G/DL (ref 3.5–5.2)
ALBUMIN/GLOB SERPL: 1.3 G/DL
ALP SERPL-CCNC: 55 U/L (ref 39–117)
ALT SERPL W P-5'-P-CCNC: 17 U/L (ref 1–33)
ANION GAP SERPL CALCULATED.3IONS-SCNC: 10.8 MMOL/L (ref 5–15)
AST SERPL-CCNC: 13 U/L (ref 1–32)
BASOPHILS # BLD AUTO: 0.09 10*3/MM3 (ref 0–0.2)
BASOPHILS NFR BLD AUTO: 1.4 % (ref 0–1.5)
BILIRUB SERPL-MCNC: 0.3 MG/DL (ref 0–1.2)
BUN SERPL-MCNC: 7 MG/DL (ref 6–20)
BUN/CREAT SERPL: 11.3 (ref 7–25)
CALCIUM SPEC-SCNC: 9 MG/DL (ref 8.6–10.5)
CHLORIDE SERPL-SCNC: 106 MMOL/L (ref 98–107)
CO2 SERPL-SCNC: 23.2 MMOL/L (ref 22–29)
CREAT SERPL-MCNC: 0.62 MG/DL (ref 0.57–1)
DEPRECATED RDW RBC AUTO: 38.5 FL (ref 37–54)
EGFRCR SERPLBLD CKD-EPI 2021: 122.3 ML/MIN/1.73
EOSINOPHIL # BLD AUTO: 0.29 10*3/MM3 (ref 0–0.4)
EOSINOPHIL NFR BLD AUTO: 4.7 % (ref 0.3–6.2)
ERYTHROCYTE [DISTWIDTH] IN BLOOD BY AUTOMATED COUNT: 12.7 % (ref 12.3–15.4)
GEN 5 2HR TROPONIN T REFLEX: <6 NG/L
GLOBULIN UR ELPH-MCNC: 3.2 GM/DL
GLUCOSE SERPL-MCNC: 105 MG/DL (ref 65–99)
HCT VFR BLD AUTO: 39.5 % (ref 34–46.6)
HGB BLD-MCNC: 13 G/DL (ref 12–15.9)
HOLD SPECIMEN: NORMAL
HOLD SPECIMEN: NORMAL
IMM GRANULOCYTES # BLD AUTO: 0.01 10*3/MM3 (ref 0–0.05)
IMM GRANULOCYTES NFR BLD AUTO: 0.2 % (ref 0–0.5)
LYMPHOCYTES # BLD AUTO: 1.87 10*3/MM3 (ref 0.7–3.1)
LYMPHOCYTES NFR BLD AUTO: 30 % (ref 19.6–45.3)
MAGNESIUM SERPL-MCNC: 2 MG/DL (ref 1.6–2.6)
MCH RBC QN AUTO: 27.2 PG (ref 26.6–33)
MCHC RBC AUTO-ENTMCNC: 32.9 G/DL (ref 31.5–35.7)
MCV RBC AUTO: 82.6 FL (ref 79–97)
MONOCYTES # BLD AUTO: 0.42 10*3/MM3 (ref 0.1–0.9)
MONOCYTES NFR BLD AUTO: 6.7 % (ref 5–12)
NEUTROPHILS NFR BLD AUTO: 3.55 10*3/MM3 (ref 1.7–7)
NEUTROPHILS NFR BLD AUTO: 57 % (ref 42.7–76)
NRBC BLD AUTO-RTO: 0 /100 WBC (ref 0–0.2)
PLATELET # BLD AUTO: 313 10*3/MM3 (ref 140–450)
PMV BLD AUTO: 10.1 FL (ref 6–12)
POTASSIUM SERPL-SCNC: 4.1 MMOL/L (ref 3.5–5.2)
PROT SERPL-MCNC: 7.4 G/DL (ref 6–8.5)
QT INTERVAL: 341 MS
QT INTERVAL: 409 MS
RBC # BLD AUTO: 4.78 10*6/MM3 (ref 3.77–5.28)
SODIUM SERPL-SCNC: 140 MMOL/L (ref 136–145)
TROPONIN T DELTA: NORMAL
TROPONIN T SERPL HS-MCNC: 7 NG/L
WBC NRBC COR # BLD: 6.23 10*3/MM3 (ref 3.4–10.8)
WHOLE BLOOD HOLD COAG: NORMAL
WHOLE BLOOD HOLD SPECIMEN: NORMAL

## 2023-08-04 PROCEDURE — 93005 ELECTROCARDIOGRAM TRACING: CPT | Performed by: EMERGENCY MEDICINE

## 2023-08-04 PROCEDURE — 84484 ASSAY OF TROPONIN QUANT: CPT | Performed by: EMERGENCY MEDICINE

## 2023-08-04 PROCEDURE — 80053 COMPREHEN METABOLIC PANEL: CPT | Performed by: EMERGENCY MEDICINE

## 2023-08-04 PROCEDURE — 99284 EMERGENCY DEPT VISIT MOD MDM: CPT

## 2023-08-04 PROCEDURE — 93010 ELECTROCARDIOGRAM REPORT: CPT | Performed by: INTERNAL MEDICINE

## 2023-08-04 PROCEDURE — 83735 ASSAY OF MAGNESIUM: CPT | Performed by: EMERGENCY MEDICINE

## 2023-08-04 PROCEDURE — 71045 X-RAY EXAM CHEST 1 VIEW: CPT

## 2023-08-04 PROCEDURE — 93005 ELECTROCARDIOGRAM TRACING: CPT

## 2023-08-04 PROCEDURE — 85025 COMPLETE CBC W/AUTO DIFF WBC: CPT | Performed by: EMERGENCY MEDICINE

## 2023-08-04 PROCEDURE — 36415 COLL VENOUS BLD VENIPUNCTURE: CPT

## 2023-08-04 RX ORDER — ASPIRIN 81 MG/1
324 TABLET, CHEWABLE ORAL ONCE
Status: COMPLETED | OUTPATIENT
Start: 2023-08-04 | End: 2023-08-04

## 2023-08-04 RX ORDER — SODIUM CHLORIDE 0.9 % (FLUSH) 0.9 %
10 SYRINGE (ML) INJECTION AS NEEDED
Status: DISCONTINUED | OUTPATIENT
Start: 2023-08-04 | End: 2023-08-04 | Stop reason: HOSPADM

## 2023-08-04 RX ADMIN — ASPIRIN 324 MG: 81 TABLET, CHEWABLE ORAL at 09:56

## 2023-08-04 NOTE — DISCHARGE INSTRUCTIONS
Exertion.  Take a low-dose 81 mg aspirin daily.  Return for worsening chest pain or other concerns.

## 2023-08-04 NOTE — ED PROVIDER NOTES
Time: 9:45 AM EDT  Date of encounter:  2023  Independent Historian/Clinical History and Information was obtained by:   Patient    History is limited by: N/A    Chief Complaint: Chest pain      History of Present Illness:  Patient is a 33 y.o. year old female who presents to the emergency department for evaluation of chest pain has been present for the last 7 days.  She notes a constant dull discomfort with superimposed waxing and waning element.  There are no exacerbating alleviating factors.  She presently states the discomfort is mild.  She denies dyspnea or diaphoresis in association with the discomfort.  She states it does radiate into the left arm at times.    HPI    Patient Care Team  Primary Care Provider: Liz Bagley MD    Past Medical History:     Allergies   Allergen Reactions    Amoxicillin Nausea And Vomiting     Upsets stomach  Upsets stomach     Past Medical History:   Diagnosis Date    Anxiety     Gestational hypertension     Kidney stones      Past Surgical History:   Procedure Laterality Date     SECTION       Family History   Problem Relation Age of Onset    Endometrial cancer Mother     Hypertension Mother     Diabetes Maternal Grandmother     Hypertension Maternal Grandmother        Home Medications:  Prior to Admission medications    Medication Sig Start Date End Date Taking? Authorizing Provider   cyclobenzaprine (FLEXERIL) 10 MG tablet Take 1 tablet by mouth 3 (Three) Times a Day As Needed for Muscle Spasms. 22   Dominik Ruano PA-C   escitalopram (LEXAPRO) 10 MG tablet TAKE 1 TABLET BY MOUTH ONE TIME A DAY 17   Provider, MD Estephanie   ondansetron (ZOFRAN) 4 MG tablet Take 1 tablet by mouth Every 8 (Eight) Hours As Needed for Nausea or Vomiting. 18   Alfredo Jennings MD   predniSONE (DELTASONE) 20 MG tablet Take 1 tablet by mouth Daily. 22   Dominik Ruano PA-C   Prenatal Vit-Fe Fumarate-FA (PRENATAL, CLASSIC, VITAMIN) 28-0.8 MG tablet  "tablet Take  by mouth Daily.    Provider, MD Estephanie   terconazole (TERAZOL 7) 0.4 % vaginal cream Insert 1 applicator into the vagina Every Night. 3/28/18   Alfredo Jennings MD        Social History:   Social History     Tobacco Use    Smoking status: Former    Smokeless tobacco: Former    Tobacco comments:     stopped 3-19-18   Vaping Use    Vaping Use: Every day    Substances: Nicotine   Substance Use Topics    Alcohol use: No    Drug use: No         Review of Systems:  Review of Systems   Constitutional:  Negative for chills and fever.   HENT:  Negative for congestion, ear pain and sore throat.    Eyes:  Negative for pain.   Respiratory:  Negative for cough, chest tightness and shortness of breath.    Cardiovascular:  Positive for chest pain.   Gastrointestinal:  Negative for abdominal pain, diarrhea, nausea and vomiting.   Genitourinary:  Negative for flank pain and hematuria.   Musculoskeletal:  Negative for joint swelling.   Skin:  Negative for pallor.   Neurological:  Negative for seizures and headaches.   All other systems reviewed and are negative.     Physical Exam:  /68   Pulse 66   Temp 98.4 øF (36.9 øC) (Oral)   Resp 18   Ht 165.1 cm (65\")   Wt 118 kg (259 lb 0.7 oz)   LMP 01/31/2018   SpO2 100%   BMI 43.11 kg/mý     Physical Exam  Constitutional:       Appearance: Normal appearance.   HENT:      Head: Normocephalic and atraumatic.      Nose: Nose normal.      Mouth/Throat:      Mouth: Mucous membranes are moist.   Eyes:      Extraocular Movements: Extraocular movements intact.      Conjunctiva/sclera: Conjunctivae normal.      Pupils: Pupils are equal, round, and reactive to light.   Cardiovascular:      Rate and Rhythm: Normal rate and regular rhythm.      Pulses: Normal pulses.      Heart sounds: Normal heart sounds.   Pulmonary:      Effort: Pulmonary effort is normal.      Breath sounds: Normal breath sounds. No wheezing.   Abdominal:      Palpations: Abdomen is soft.      " Tenderness: There is no abdominal tenderness.   Musculoskeletal:         General: Normal range of motion.      Cervical back: Normal range of motion and neck supple.      Right lower leg: No edema.      Left lower leg: No edema.   Skin:     General: Skin is warm and dry.      Capillary Refill: Capillary refill takes less than 2 seconds.      Findings: No rash.   Neurological:      General: No focal deficit present.      Mental Status: She is alert and oriented to person, place, and time. Mental status is at baseline.      Cranial Nerves: No cranial nerve deficit.      Sensory: No sensory deficit.      Motor: No weakness.   Psychiatric:         Mood and Affect: Mood normal.         Behavior: Behavior normal.                Procedures:  Procedures      Medical Decision Making:      Comorbidities that affect care:    None    External Notes reviewed:    None      The following orders were placed and all results were independently analyzed by me:  Orders Placed This Encounter   Procedures    XR Chest 1 View    Reeders Draw    Comprehensive Metabolic Panel    High Sensitivity Troponin T    Magnesium    CBC Auto Differential    High Sensitivity Troponin T 2Hr    Continuous Pulse Oximetry    Vital Signs    Oxygen Therapy- Nasal Cannula; Titrate 1-6 LPM Per SpO2; 90 - 95%    ECG 12 Lead Chest Pain    ECG 12 Lead Chest Pain    ECG 12 Lead Chest Pain    Insert Peripheral IV    CBC & Differential    Green Top (Gel)    Lavender Top    Gold Top - SST    Light Blue Top       Medications Given in the Emergency Department:  Medications   sodium chloride 0.9 % flush 10 mL (has no administration in time range)   aspirin chewable tablet 324 mg (324 mg Oral Given 8/4/23 0956)        ED Course:    ED Course as of 08/04/23 1235   Fri Aug 04, 2023   0931 EKG: Rate 100, normal P waves, normal QRS, normal ST segment, normal QT interval, no previous for comparison. [RW]      ED Course User Index  [RW] Ankush Caro MD       Labs:    Lab  Results (last 24 hours)       Procedure Component Value Units Date/Time    CBC & Differential [[id number]]  (Normal) Collected: 08/04/23 1020    Specimen: Blood Updated: 08/04/23 1029    Narrative:      The following orders were created for panel order CBC & Differential.  Procedure                               Abnormality         Status                     ---------                               -----------         ------                     CBC Auto Differential[[id number]]        Normal              Final result                 Please view results for these tests on the individual orders.    Comprehensive Metabolic Panel [[id number]]  (Abnormal) Collected: 08/04/23 1020    Specimen: Blood Updated: 08/04/23 1056     Glucose 105 mg/dL      BUN 7 mg/dL      Creatinine 0.62 mg/dL      Sodium 140 mmol/L      Potassium 4.1 mmol/L      Chloride 106 mmol/L      CO2 23.2 mmol/L      Calcium 9.0 mg/dL      Total Protein 7.4 g/dL      Albumin 4.2 g/dL      ALT (SGPT) 17 U/L      AST (SGOT) 13 U/L      Alkaline Phosphatase 55 U/L      Total Bilirubin 0.3 mg/dL      Globulin 3.2 gm/dL      A/G Ratio 1.3 g/dL      BUN/Creatinine Ratio 11.3     Anion Gap 10.8 mmol/L      eGFR 122.3 mL/min/1.73     Narrative:      GFR Normal >60  Chronic Kidney Disease <60  Kidney Failure <15      High Sensitivity Troponin T [[id number]]  (Normal) Collected: 08/04/23 1020    Specimen: Blood Updated: 08/04/23 1056     HS Troponin T 7 ng/L     Narrative:      High Sensitive Troponin T Reference Range:  <10.0 ng/L- Negative Female for AMI  <15.0 ng/L- Negative Male for AMI  >=10 - Abnormal Female indicating possible myocardial injury.  >=15 - Abnormal Male indicating possible myocardial injury.   Clinicians would have to utilize clinical acumen, EKG, Troponin, and serial changes to determine if it is an Acute Myocardial Infarction or myocardial injury due to an underlying chronic condition.         Magnesium [[id number]]  (Normal) Collected: 08/04/23  1020    Specimen: Blood Updated: 08/04/23 1056     Magnesium 2.0 mg/dL     CBC Auto Differential [[id number]]  (Normal) Collected: 08/04/23 1020    Specimen: Blood Updated: 08/04/23 1029     WBC 6.23 10*3/mm3      RBC 4.78 10*6/mm3      Hemoglobin 13.0 g/dL      Hematocrit 39.5 %      MCV 82.6 fL      MCH 27.2 pg      MCHC 32.9 g/dL      RDW 12.7 %      RDW-SD 38.5 fl      MPV 10.1 fL      Platelets 313 10*3/mm3      Neutrophil % 57.0 %      Lymphocyte % 30.0 %      Monocyte % 6.7 %      Eosinophil % 4.7 %      Basophil % 1.4 %      Immature Grans % 0.2 %      Neutrophils, Absolute 3.55 10*3/mm3      Lymphocytes, Absolute 1.87 10*3/mm3      Monocytes, Absolute 0.42 10*3/mm3      Eosinophils, Absolute 0.29 10*3/mm3      Basophils, Absolute 0.09 10*3/mm3      Immature Grans, Absolute 0.01 10*3/mm3      nRBC 0.0 /100 WBC     High Sensitivity Troponin T 2Hr [[id number]] Collected: 08/04/23 1123    Specimen: Blood Updated: 08/04/23 1153     HS Troponin T <6 ng/L      Troponin T Delta --     Comment: Unable to calculate.       Narrative:      High Sensitive Troponin T Reference Range:  <10.0 ng/L- Negative Female for AMI  <15.0 ng/L- Negative Male for AMI  >=10 - Abnormal Female indicating possible myocardial injury.  >=15 - Abnormal Male indicating possible myocardial injury.   Clinicians would have to utilize clinical acumen, EKG, Troponin, and serial changes to determine if it is an Acute Myocardial Infarction or myocardial injury due to an underlying chronic condition.                  Imaging:    XR Chest 1 View    Result Date: 8/4/2023  PROCEDURE: XR CHEST 1 VW  COMPARISON: None  INDICATIONS: Chest Pain x several days  FINDINGS:  Heart size and pulmonary vasculature within normal limits.  Lungs clear other than mild atelectasis in the medial right lung base.  Costophrenic angles sharp       No active cardiopulmonary disease       JOHNNY RIVERA MD       Electronically Signed and Approved By: JOHNNY RIVERA MD on  8/04/2023 at 10:09                Differential Diagnosis and Discussion:    Chest Pain:  Based on the patient's signs and symptoms, I considered aortic dissection, myocardial infaction, pulmonary embolism, cardiac tamponade, pericarditis, pneumothorax, musculoskeletal chest pain and other differential diagnosis as an etiology of the patient's chest pain.     EKG was interpreted by me.    MDM  Number of Diagnoses or Management Options  Chest pain, unspecified type  Diagnosis management comments: Presents complaint of chest pain.  Heart score is low and she is appropriate for outpatient management.       Amount and/or Complexity of Data Reviewed  Clinical lab tests: reviewed  Tests in the radiology section of CP[facility]: reviewed  Tests in the medicine section of [facility]: reviewed             Patient Care Considerations:    CT CHEST: I considered ordering a CT scan of the chest, however the patient is PERC negative.      Consultants/Shared Management Plan:    None    Social Determinants of Health:    Patient is independent, reliable, and has access to care.       Disposition and Care Coordination:    Discharged: The patient is suitable and stable for discharge with no need for consideration of observation or admission.    I have explained discharge medications and the need for follow up with the patient/caretakers. This was also printed in the discharge instructions. Patient was discharged with the following medications and follow up:      Medication List      No changes were made to your prescriptions during this visit.      No follow-up provider specified.     Final diagnoses:   Chest pain, unspecified type        ED Disposition       ED Disposition   Discharge    Condition   Stable    Comment   --               This medical record created using voice recognition software.             Ankush Caro MD  08/04/23 9307

## 2024-05-02 ENCOUNTER — TELEMEDICINE (OUTPATIENT)
Dept: FAMILY MEDICINE CLINIC | Facility: TELEHEALTH | Age: 34
End: 2024-05-02
Payer: COMMERCIAL

## 2024-05-02 VITALS — HEART RATE: 103 BPM | TEMPERATURE: 98.9 F

## 2024-05-02 DIAGNOSIS — J02.0 STREP THROAT: Primary | ICD-10-CM

## 2024-05-02 PROBLEM — E66.01 CLASS 3 SEVERE OBESITY WITH BODY MASS INDEX (BMI) OF 40.0 TO 44.9 IN ADULT: Status: ACTIVE | Noted: 2020-05-27

## 2024-05-02 PROBLEM — R53.82 CHRONIC FATIGUE: Status: ACTIVE | Noted: 2020-05-27

## 2024-05-02 PROBLEM — R73.03 PREDIABETES: Status: ACTIVE | Noted: 2020-05-29

## 2024-05-02 PROBLEM — E66.813 CLASS 3 SEVERE OBESITY WITH BODY MASS INDEX (BMI) OF 40.0 TO 44.9 IN ADULT: Status: ACTIVE | Noted: 2020-05-27

## 2024-05-02 RX ORDER — PHENTERMINE HYDROCHLORIDE 37.5 MG/1
37.5 TABLET ORAL
COMMUNITY
Start: 2024-04-21

## 2024-05-02 RX ORDER — CEPHALEXIN 500 MG/1
500 CAPSULE ORAL 4 TIMES DAILY
Qty: 40 CAPSULE | Refills: 0 | Status: SHIPPED | OUTPATIENT
Start: 2024-05-02 | End: 2024-05-12

## 2024-05-02 RX ORDER — CITALOPRAM 20 MG/1
20 TABLET ORAL DAILY
COMMUNITY
Start: 2024-01-24 | End: 2025-01-23

## 2024-05-02 NOTE — PROGRESS NOTES
CHIEF COMPLAINT  Chief Complaint   Patient presents with    Sore Throat    Earache         HPI  Elen Durán is a 33 y.o. female  presents with complaint of sore throat and right ear pain. She has had this for 1-2 days. She thought it was drainage, but her tonsils are red and swollen now.     Review of Systems   Constitutional:  Positive for fatigue. Negative for chills, diaphoresis and fever.   HENT:  Positive for postnasal drip and sore throat. Negative for congestion and rhinorrhea.    Respiratory:  Positive for cough. Negative for chest tightness, shortness of breath and wheezing.    Gastrointestinal:  Negative for diarrhea, nausea and vomiting.   Musculoskeletal:  Negative for myalgias.   Neurological:  Positive for headaches.   Hematological:  Positive for adenopathy.       Past Medical History:   Diagnosis Date    Anxiety     Gestational hypertension     Kidney stones        Family History   Problem Relation Age of Onset    Endometrial cancer Mother     Hypertension Mother     Diabetes Maternal Grandmother     Hypertension Maternal Grandmother        Social History     Socioeconomic History    Marital status:    Tobacco Use    Smoking status: Former    Smokeless tobacco: Former    Tobacco comments:     stopped 3-19-18   Vaping Use    Vaping status: Every Day    Substances: Nicotine   Substance and Sexual Activity    Alcohol use: No    Drug use: No    Sexual activity: Yes     Partners: Male                Pulse 103   Temp 98.9 °F (37.2 °C)   LMP 01/31/2018   Breastfeeding No     PHYSICAL EXAM  Physical Exam   Constitutional: She is oriented to person, place, and time. She appears well-developed and well-nourished. She does not have a sickly appearance. She does not appear ill. No distress.   HENT:   Head: Normocephalic and atraumatic.   Mouth/Throat: Mouth/Lips are normal.Uvula is midline and oropharynx is clear and moist. Mucous membranes are not pale, not dry, not cyanotic and erythematous  (erythematous tonsils bilaterally with right tonsil 3+ and left 2+.). No tonsillar exudate. no white patchesblistered.  Right TM with mild erythema and no bulging. Left TM with no erythema or bulging.    Eyes: EOM are normal.   Pulmonary/Chest: Effort normal.  No respiratory distress.  Lymphadenopathy:     She has cervical adenopathy (bilaterally per patient).   Neurological: She is alert and oriented to person, place, and time.   Skin: Skin is dry.   Psychiatric: She has a normal mood and affect.           Diagnoses and all orders for this visit:    1. Strep throat (Primary)  -     POC Strep A, PCR ([facility]); Future    Other orders  -     cephalexin (Keflex) 500 MG capsule; Take 1 capsule by mouth 4 (Four) Times a Day for 10 days.  Dispense: 40 capsule; Refill: 0    Notified mother of results     The use of a video visit has been reviewed with the patient and verbal informed consent has been obtained. Myself and Elen Durán participated in this visit. The patient is located in [facility]  I am located in [place], [place]. Mychart and Tyto were utilized.       Note Disclaimer: At [facility], we believe that sharing information builds trust and better   relationships. You are receiving this note because you recently visited [facility]. It is possible you   will see health information before a provider has talked with you about it. This kind of information can   be easy to misunderstand. To help you fully understand what it means for your health, we urge you to   discuss this note with your provider.    KIA Storey  05/02/2024  09:40 EDT

## 2024-11-25 ENCOUNTER — HOSPITAL ENCOUNTER (EMERGENCY)
Facility: HOSPITAL | Age: 34
Discharge: HOME | End: 2024-11-25
Attending: EMERGENCY MEDICINE | Admitting: EMERGENCY MEDICINE
Payer: COMMERCIAL

## 2024-11-25 ENCOUNTER — APPOINTMENT (OUTPATIENT)
Dept: GENERAL RADIOLOGY | Facility: HOSPITAL | Age: 34
End: 2024-11-25
Payer: COMMERCIAL

## 2024-11-25 VITALS
BODY MASS INDEX: 37.49 KG/M2 | DIASTOLIC BLOOD PRESSURE: 87 MMHG | RESPIRATION RATE: 16 BRPM | WEIGHT: 225 LBS | SYSTOLIC BLOOD PRESSURE: 141 MMHG | TEMPERATURE: 97.9 F | OXYGEN SATURATION: 100 % | HEIGHT: 65 IN | HEART RATE: 81 BPM

## 2024-11-25 DIAGNOSIS — M54.50 ACUTE LEFT-SIDED LOW BACK PAIN WITHOUT SCIATICA: Primary | ICD-10-CM

## 2024-11-25 PROCEDURE — 72100 X-RAY EXAM L-S SPINE 2/3 VWS: CPT

## 2024-11-25 PROCEDURE — 97161 PT EVAL LOW COMPLEX 20 MIN: CPT | Performed by: PHYSICAL THERAPIST

## 2024-11-25 PROCEDURE — 96372 THER/PROPH/DIAG INJ SC/IM: CPT

## 2024-11-25 PROCEDURE — 99283 EMERGENCY DEPT VISIT LOW MDM: CPT

## 2024-11-25 PROCEDURE — 25010000002 KETOROLAC TROMETHAMINE PER 15 MG

## 2024-11-25 PROCEDURE — 97110 THERAPEUTIC EXERCISES: CPT | Performed by: PHYSICAL THERAPIST

## 2024-11-25 RX ORDER — KETOROLAC TROMETHAMINE 10 MG/1
10 TABLET, FILM COATED ORAL EVERY 6 HOURS PRN
Qty: 20 TABLET | Refills: 0 | Status: SHIPPED | OUTPATIENT
Start: 2024-11-25 | End: 2024-11-30

## 2024-11-25 RX ORDER — CYCLOBENZAPRINE HCL 10 MG
10 TABLET ORAL 3 TIMES DAILY PRN
Qty: 20 TABLET | Refills: 0 | Status: SHIPPED | OUTPATIENT
Start: 2024-11-25

## 2024-11-25 RX ORDER — KETOROLAC TROMETHAMINE 30 MG/ML
60 INJECTION, SOLUTION INTRAMUSCULAR; INTRAVENOUS ONCE
Status: COMPLETED | OUTPATIENT
Start: 2024-11-25 | End: 2024-11-25

## 2024-11-25 RX ADMIN — KETOROLAC TROMETHAMINE 60 MG: 30 INJECTION, SOLUTION INTRAMUSCULAR at 13:07

## 2024-11-25 NOTE — THERAPY EVALUATION
Patient Name: Elen Durán  : 1990    MRN: [id number]                              Today's Date: 2024       Admit Date: 2024    Visit Dx:     ICD-10-CM ICD-9-CM   1. Acute left-sided low back pain without sciatica  M54.50 724.2     Patient Active Problem List   Diagnosis    Chronic fatigue    Class 3 severe obesity with body mass index (BMI) of 40.0 to 44.9 in adult    Prediabetes     Past Medical History:   Diagnosis Date    Anxiety     Gestational hypertension     Kidney stones      Past Surgical History:   Procedure Laterality Date     SECTION        General Information       Row Name 24 1332          Physical Therapy Time and Intention    Document Type evaluation  -LR     Mode of Treatment individual therapy  -LR       Row Name 24 1332          General Information    Patient Profile Reviewed yes  -LR     Prior Level of Function independent:  -LR               User Key  (r) = Recorded By, (t) = Taken By, (c) = Cosigned By      Initials Name Provider Type    LR Malinda Mead, PT Physical Therapist                  History: Pt states on Saturday she bent over and when she stood up she had a sharp pain in her back.  She states the pain was better yesterday and she sat for a lot of the day.  She states today her pain has increased.  She reports intermittent tingling into her left leg.  She denies loss of bowel and bladder. She works at a desk job.    Objective:    Palpation: Tender to palpation at lumbar spinous processes, L lumbar paraspinals, R quadratus lumborum    ROM:  Lumbar ROM:  Flexion: 25%  Extension: WNL  L Side bendin%  R Side bending: WNL  L Rotation: 50%  R Rotation: WNL    Increased LBP with passive B hip flexion     Strength:  L Hip MMT:   R Hip MMT:  Flexion: 5/5  Flexion: 5/5  Abduction: 5/5  Abduction: 5/5  Adduction: 5/5  Adduction: 5/5    L Knee MMT:  R Knee MMT:  Flexion: 4+/5  Flexion: 5/5  Extension: 4+/5 Extension: 5/5    L Ankle MMT:  R Ankle  MMT:  DF: 5/5  DF: 5/5  PF: 5/5   PF: 5/5    Special Tests:  Quadrant Test: negative  Slump Test: negative B  Straight Leg Raise Test: positive B  Contralateral Straight Leg Raise Test: negative B  Obers Test: NT     Sensation: B LE sensation intact to light touch    Assessment/Plan:   Pt presents with a diagnosis of low back pain and has decreased lumbar ROM that are limiting her ability to stand, bend, and walk.  The patient was educated in exercises to improve lumbar mobility and pain.  She was provided with a HEP handout.     Goals:   LTG 1: The patient will be independent in HEP in order to decrease pain and improve tolerance to functional activities.  STATUS: Met    Interventions:   Manual Therapy: not performed    Therapeutic Exercises: HEP: LTR, SKTC, piriformis stretch, quadratus lumborum stretch, posterior pelvic tilt, prone on elbows, prone press up    Modalities: not performed      Outcome Measures       Row Name 11/25/24 1332          Optimal Instrument    Optimal Instrument Optimal - 3  -LR     Bending/Stooping 4  -LR     Standing 3  -LR     Walking - short distance 3  -LR     From the list, choose the 3 activities you would most like to be able to do without any difficulty Bending/stooping;Standing;Walking -short distance  -LR     Total Score Optimal - 3 10  -LR       Row Name 11/25/24 1332          Functional Assessment    Outcome Measure Options Optimal Instrument  -LR               User Key  (r) = Recorded By, (t) = Taken By, (c) = Cosigned By      Initials Name Provider Type    Malinda Finney, PT Physical Therapist                     Time Calculation:   PT Evaluation Complexity  History, PT Evaluation Complexity: 1-2 personal factors and/or comorbidities  Examination of Body Systems (PT Eval Complexity): 1-2 elements  Clinical Presentation (PT Evaluation Complexity): stable  Clinical Decision Making (PT Evaluation Complexity): low complexity  Overall Complexity (PT Evaluation Complexity): low  complexity     PT Charges       Row Name 11/25/24 1333             Time Calculation    PT Received On 11/25/24  -LR         Timed Charges    45032 - PT Therapeutic Exercise Minutes 8  -LR         Untimed Charges    PT Eval/Re-eval Minutes 16  -LR         Total Minutes    Timed Charges Total Minutes 8  -LR      Untimed Charges Total Minutes 16  -LR       Total Minutes 24  -LR                User Key  (r) = Recorded By, (t) = Taken By, (c) = Cosigned By      Initials Name Provider Type    LR Malinda Mead, PT Physical Therapist                  Therapy Charges for Today       Code Description Service Date Service Provider Modifiers Qty    74156582711 HC PT THER PROC EA 15 MIN 11/25/2024 Malinda Mead, PT GP 1    17195544335 HC PT EVAL LOW COMPLEXITY 2 11/25/2024 Malinda Mead, PT GP 1            PT G-Codes  Outcome Measure Options: Optimal Instrument       Malinda Mead, PT  11/25/2024

## 2024-11-25 NOTE — ED PROVIDER NOTES
Time: 12:50 PM EST  Date of encounter:  2024  Independent Historian/Clinical History and Information was obtained by:   Patient    History is limited by: N/A    Chief Complaint: Back pain      History of Present Illness:  Patient is a 34 y.o. year old female who presents to the emergency department for evaluation of low back pain that started last night after she bent down to  something out of her cabinet, states at first her pain was in the right side of her lower back, states today she is having pain in the left side of her back and in the middle just above the tailbone.  Patient reports numbness and tingling of the left leg, no saddle anesthesia.      Patient Care Team  Primary Care Provider: Liz Bagley MD    Past Medical History:     Allergies   Allergen Reactions    Amoxicillin Nausea And Vomiting     Upsets stomach    Upsets stomach and diarrhea    <paragraph>Upsets stomach</paragraph><paragraph>Upsets stomach</paragraph>     Past Medical History:   Diagnosis Date    Anxiety     Gestational hypertension     Kidney stones      Past Surgical History:   Procedure Laterality Date     SECTION       Family History   Problem Relation Age of Onset    Endometrial cancer Mother     Hypertension Mother     Diabetes Maternal Grandmother     Hypertension Maternal Grandmother        Home Medications:  Prior to Admission medications    Medication Sig Start Date End Date Taking? Authorizing Provider   citalopram (CeleXA) 20 MG tablet Take 1 tablet by mouth Daily. 24  Estephanie Herrmann MD   phentermine (ADIPEX-P) 37.5 MG tablet Take 1 tablet by mouth Every Morning Before Breakfast. 24   Estephanie Herrmann MD        Social History:   Social History     Tobacco Use    Smoking status: Former    Smokeless tobacco: Former    Tobacco comments:     stopped 3-19-18   Vaping Use    Vaping status: Every Day    Substances: Nicotine   Substance Use Topics    Alcohol use: No     "Drug use: No         Review of Systems:  Review of Systems   Constitutional:  Negative for chills and fever.   Gastrointestinal:  Negative for abdominal pain, nausea and vomiting.        Negative for bowel incontinence   Genitourinary:  Negative for dysuria, flank pain and hematuria.        Negative for bladder incontinence   Musculoskeletal:  Positive for back pain.   Neurological:  Negative for weakness and numbness.        Negative for saddle anesthesia   All other systems reviewed and are negative.       Physical Exam:  /87 (BP Location: Left arm, Patient Position: Sitting)   Pulse 81   Temp 97.9 °F (36.6 °C) (Oral)   Resp 16   Ht 165.1 cm (65\")   Wt 102 kg (225 lb)   LMP 01/31/2018   SpO2 100%   BMI 37.44 kg/m²     Physical Exam  Vitals and nursing note reviewed.   Constitutional:       Appearance: Normal appearance. She is not ill-appearing or toxic-appearing.   HENT:      Head: Normocephalic.      Nose: Nose normal.   Eyes:      Extraocular Movements: Extraocular movements intact.      Conjunctiva/sclera: Conjunctivae normal.      Pupils: Pupils are equal, round, and reactive to light.   Cardiovascular:      Rate and Rhythm: Normal rate.      Pulses: Normal pulses.   Pulmonary:      Effort: Pulmonary effort is normal.   Abdominal:      General: Abdomen is flat. There is no distension.      Palpations: Abdomen is soft.   Musculoskeletal:      Cervical back: Normal range of motion and neck supple.      Lumbar back: Tenderness and bony tenderness present. No swelling. Decreased range of motion.   Skin:     General: Skin is warm and dry.      Capillary Refill: Capillary refill takes less than 2 seconds.   Neurological:      General: No focal deficit present.      Mental Status: She is alert and oriented to person, place, and time.   Psychiatric:         Mood and Affect: Mood normal.            Procedures:  Procedures      Medical Decision Making:      Comorbidities that affect care:    Vaping " user    External Notes reviewed:    Previous Clinic Note: Reviewed orthopedics office visit from 11/20/2024 patient was seen for patellar subluxation of the left knee      The following orders were placed and all results were independently analyzed by me:  Orders Placed This Encounter   Procedures    XR Spine Lumbar 2 or 3 View    PT Consult: Eval & Treat Functional Mobility Below Baseline    PT Plan of Care Cert / Re-Cert       Medications Given in the Emergency Department:  Medications   ketorolac (TORADOL) injection 60 mg (60 mg Intramuscular Given 11/25/24 1307)        ED Course:         Labs:    Lab Results (last 24 hours)       ** No results found for the last 24 hours. **             Imaging:    XR Spine Lumbar 2 or 3 View    Result Date: 11/25/2024  XR SPINE LUMBAR 2 OR 3 VW Date of Exam: 11/25/2024 1:20 PM EST Indication: low back pain Comparison: None available. Findings: No fracture or malalignment is identified. Early degenerative disc changes are noted at L3-4. Paraspinal soft tissues appear normal.     Impression: Early L3-4 degenerative disc disease. Electronically Signed: Armani Pete MD  11/25/2024 1:34 PM EST  Workstation ID: [id number]       Differential Diagnosis and Discussion:    Back Pain: The patient presents with back pain. My differential diagnosis includes but is not limited to acute spinal epidural abscess, acute spinal epidural bleed, cauda equina syndrome, abdominal aortic aneurysm, aortic dissection, kidney stone, pyelonephritis, musculoskeletal back pain, spinal fracture, and osteoarthritis.     All X-rays impressions were independently interpreted by me.    MDM     The patient's symptoms are consistent with musculoskeletal back pain. The patient is now resting comfortably, feels better, is alert, talkative, interactive and in no distress. The repeat examination is unremarkable and benign. The patient is neurologically intact and is ambulatory in the ED. The patient has no fever, no  bowel or bladder incontinence, no saddle anesthesia, and is otherwise alert and well appearing. The history, physical exam, and diagnostics (if any) do not suggest the presence of acute spinal epidural abscess, acute spinal epidural bleed, cauda equina syndrome, abdominal aortic aneurysm, aortic dissection or other process requiring further testing, treatment or consultation in the emergency department. The vital signs have been stable. The patient's condition is stable and appropriate for discharge. The patient will pursue further outpatient evaluation with the primary care physician or other designated for consulting position as indicated in the discharge instructions.                Patient Care Considerations:    MRI: I considered ordering an MRI however no neurological deficits, no saddle anesthesia or other red flags for cauda equina.      Consultants/Shared Management Plan:    Consultant: I have discussed the case with Malinda Patterson PT who states she has evaluated the patient and provided her with therapeutic exercises.    Social Determinants of Health:    Patient is independent, reliable, and has access to care.       Disposition and Care Coordination:    Discharged: The patient is suitable and stable for discharge with no need for consideration of admission.    I have explained the patient´s condition, diagnoses and treatment plan based on the information available to me at this time. I have answered questions and addressed any concerns. The patient has a good  understanding of the patient´s diagnosis, condition, and treatment plan as can be expected at this point. The vital signs have been stable. The patient´s condition is stable and appropriate for discharge from the emergency department.      The patient will pursue further outpatient evaluation with the primary care physician or other designated or consulting physician as outlined in the discharge instructions. They are agreeable to this plan of care  and follow-up instructions have been explained in detail. The patient has received these instructions in written format and has expressed an understanding of the discharge instructions. The patient is aware that any significant change in condition or worsening of symptoms should prompt an immediate return to this or the closest emergency department or call to 1.  I have explained discharge medications and the need for follow up with the patient/caretakers. This was also printed in the discharge instructions. Patient was discharged with the following medications and follow up:      Medication List        New Prescriptions      cyclobenzaprine 10 MG tablet  Commonly known as: FLEXERIL  Take 1 tablet by mouth 3 (Three) Times a Day As Needed for Muscle Spasms.     ketorolac 10 MG tablet  Commonly known as: TORADOL  Take 1 tablet by mouth Every 6 (Six) Hours As Needed for Moderate Pain for up to 5 days.               Where to Get Your Medications        These medications were sent to [id number] - [place], [place] - [address] AT [facility] OF [address] - [phone] [facility] [phone]   [address] [facility] [address]      Phone: [phone]   cyclobenzaprine 10 MG tablet  ketorolac 10 MG tablet      Liz Bagley MD  [address]  [address]  [facility] [address] [phone]    Call in 1 week         Final diagnoses:   Acute left-sided low back pain without sciatica        ED Disposition       ED Disposition   Discharge    Condition   Stable    Comment   --               This medical record created using voice recognition software.             Heide Villalta, KIA  11/25/24 7389

## 2024-11-25 NOTE — DISCHARGE INSTRUCTIONS
Avoid any activity or heavy lifting, pushing, or pulling for the the next several days.  You may continue to use the TENS unit you have at home.  Continue to do the therapeutic exercises that were shown to you by the physical therapist.  Follow-up with your primary care provider.